# Patient Record
Sex: MALE
[De-identification: names, ages, dates, MRNs, and addresses within clinical notes are randomized per-mention and may not be internally consistent; named-entity substitution may affect disease eponyms.]

---

## 2020-05-21 ENCOUNTER — TRANSCRIPTION ENCOUNTER (OUTPATIENT)
Age: 40
End: 2020-05-21

## 2024-06-07 PROBLEM — Z00.00 ENCOUNTER FOR PREVENTIVE HEALTH EXAMINATION: Status: ACTIVE | Noted: 2024-06-07

## 2024-06-10 ENCOUNTER — APPOINTMENT (OUTPATIENT)
Dept: PULMONOLOGY | Facility: CLINIC | Age: 44
End: 2024-06-10
Payer: COMMERCIAL

## 2024-06-10 VITALS
BODY MASS INDEX: 27.06 KG/M2 | WEIGHT: 189 LBS | HEIGHT: 70 IN | SYSTOLIC BLOOD PRESSURE: 107 MMHG | DIASTOLIC BLOOD PRESSURE: 60 MMHG | HEART RATE: 80 BPM

## 2024-06-10 DIAGNOSIS — Z78.9 OTHER SPECIFIED HEALTH STATUS: ICD-10-CM

## 2024-06-10 DIAGNOSIS — G47.19 OTHER HYPERSOMNIA: ICD-10-CM

## 2024-06-10 DIAGNOSIS — G47.33 OBSTRUCTIVE SLEEP APNEA (ADULT) (PEDIATRIC): ICD-10-CM

## 2024-06-10 DIAGNOSIS — E78.5 HYPERLIPIDEMIA, UNSPECIFIED: ICD-10-CM

## 2024-06-10 PROCEDURE — 99203 OFFICE O/P NEW LOW 30 MIN: CPT

## 2024-06-10 RX ORDER — SEMAGLUTIDE 1.34 MG/ML
INJECTION, SOLUTION SUBCUTANEOUS
Refills: 0 | Status: ACTIVE | COMMUNITY

## 2024-06-10 RX ORDER — SERTRALINE HYDROCHLORIDE 25 MG/1
TABLET, FILM COATED ORAL
Refills: 0 | Status: ACTIVE | COMMUNITY

## 2024-06-10 RX ORDER — LORAZEPAM 2 MG/1
TABLET ORAL
Refills: 0 | Status: ACTIVE | COMMUNITY

## 2024-06-10 RX ORDER — ATORVASTATIN CALCIUM 80 MG/1
TABLET, FILM COATED ORAL
Refills: 0 | Status: ACTIVE | COMMUNITY

## 2024-06-10 NOTE — ASSESSMENT
[FreeTextEntry1] : Recommend watch pat study to confirm the diagnosis of Obstructive Sleep Apnea (JB) and determine the severity. CPAP Trial:  Based on sleep study results, consider initiating a CPAP trial if JB is confirmed. Discussed the potential benefits of CPAP therapy for improving snoring, apneas, and overall sleep quality.  Encourage maintaining a consistent sleep schedule, going to bed, and waking up at the same time daily. Limit caffeine intake, especially in the afternoon and evening, to improve sleep quality. Suggest a reduction in noise and light in the bedroom to create a more conducive sleep environment.  Discuss the potential impact of weight on JB and snoring. Continue ozempic, pt has seen a good benefit so far. Encourage a healthy diet and regular physical activity to achieve and maintain a healthy weight.  Schedule a follow-up appointment to review the results of the sleep study and cpap data.  Educated the patient about JB and the importance of treatment. Explained the potential health risks associated with untreated JB, such as cardiovascular issues and daytime fatigue. Provided information on CPAP therapy, including its benefits and usage.

## 2024-06-10 NOTE — HISTORY OF PRESENT ILLNESS
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  Patient is sleepy with Clymer sleepiness score of 12.  Patient has very loud snoring which disturbs his wife, also has witnessed apneas.  Patient's bedtime is around 10 PM wakes up in the morning around 5.30 AM.   He feels rested when he wakes up.   Patient drinks 2-3 cups of coffee during the daytime. Patient does not have any headaches or nocturia. He is not sleepy while driving. STOPBANG score - 4 neck size - 15 and half inches

## 2024-06-10 NOTE — PHYSICAL EXAM
[General Appearance - Well Developed] : well developed [General Appearance - Well Nourished] : well nourished [Enlarged Base of the Tongue] : enlargement of the base of the tongue [II] : II [Heart Sounds] : normal S1 and S2 [Murmurs] : no murmurs [] : no respiratory distress [Auscultation Breath Sounds / Voice Sounds] : lungs were clear to auscultation bilaterally [No Focal Deficits] : no focal deficits [Oriented To Time, Place, And Person] : oriented to person, place, and time [Memory Recent] : recent memory was not impaired [FreeTextEntry1] : no edema

## 2024-07-28 ENCOUNTER — NON-APPOINTMENT (OUTPATIENT)
Age: 44
End: 2024-07-28